# Patient Record
Sex: FEMALE | Race: WHITE
[De-identification: names, ages, dates, MRNs, and addresses within clinical notes are randomized per-mention and may not be internally consistent; named-entity substitution may affect disease eponyms.]

---

## 2020-07-22 ENCOUNTER — HOSPITAL ENCOUNTER (EMERGENCY)
Dept: HOSPITAL 56 - MW.ED | Age: 25
Discharge: HOME | End: 2020-07-22
Payer: COMMERCIAL

## 2020-07-22 VITALS — DIASTOLIC BLOOD PRESSURE: 76 MMHG | SYSTOLIC BLOOD PRESSURE: 128 MMHG | HEART RATE: 88 BPM

## 2020-07-22 DIAGNOSIS — J45.909: ICD-10-CM

## 2020-07-22 DIAGNOSIS — R00.0: ICD-10-CM

## 2020-07-22 DIAGNOSIS — Z88.2: ICD-10-CM

## 2020-07-22 DIAGNOSIS — T78.2XXA: Primary | ICD-10-CM

## 2020-07-22 DIAGNOSIS — Z91.048: ICD-10-CM

## 2020-07-22 DIAGNOSIS — Z79.899: ICD-10-CM

## 2020-07-22 PROCEDURE — 96374 THER/PROPH/DIAG INJ IV PUSH: CPT

## 2020-07-22 PROCEDURE — 99285 EMERGENCY DEPT VISIT HI MDM: CPT

## 2020-07-22 PROCEDURE — 93005 ELECTROCARDIOGRAM TRACING: CPT

## 2020-07-22 PROCEDURE — 96375 TX/PRO/DX INJ NEW DRUG ADDON: CPT

## 2020-07-22 NOTE — EDM.PDOC
ED HPI GENERAL MEDICAL PROBLEM





- General


Chief Complaint: Allergic Reaction


Stated Complaint: ALLERGIC REACTION


Time Seen by Provider: 07/22/20 15:39





- History of Present Illness


INITIAL COMMENTS - FREE TEXT/NARRATIVE: 





History of present illness:


25-year-old female presenting with shortness of breath and throat tightness.  

Symptoms started about 1:30 PM.  Around 12:30 PM she was having allergy shots 

done and then she returned back to work and then shortly thereafter the symptoms

started.  She did self administer her epinephrine and EMS picked her up and she 

continued to have some shortness of breath.  They did give her some albuterol.  

She reports she is now feeling much better though does still have some heaviness

in the chest.





Review of systems: 


As per history of present illness and below otherwise all systems reviewed and 

negative.





Past medical history: 


As per history of present illness and as reviewed below otherwise 

noncontributory.





Surgical history: 


As per history of present illness and as reviewed below otherwise 

noncontributory.  Haughton teeth, ear tubes, tear duct tube





Social history: 


No reported history of drug or alcohol abuse.  No tobacco





Family history: 


As per history of present illness and as reviewed below otherwise 

noncontributory.





Physical exam:


GEN: no acute distress, well appearing


HEENT: Atraumatic, normocephalic, mucous membranes moist, 


Neck: supple, nontender, trachea midline.


Lungs: No respiratory distress.


Heart: Slightly tachycardic


Abdomen: Soft, nondistended, nontender. 


Back: nontender


Extremities: Atraumatic. Neurovascularly intact.


Neuro: Awake, alert, oriented. Neuro Exam nonfocal.


Psych: Mildly anxious


Skin: warm, dry, no lesions





Diagnostics:


[]





Therapeutics:


[]





MDM: 





Impression: 


[]





Plan:


[]





Definitive disposition and diagnosis as appropriate pending reevaluation and 

review of above.











- Related Data


                                    Allergies











Allergy/AdvReac Type Severity Reaction Status Date / Time


 


sulfamethoxazole Allergy Severe Redness Verified 10/19/14 11:39 MDT





[From Septra]     


 


trimethoprim [From Septra] Allergy Severe Redness Verified 10/19/14 11:39 MDT


 


horses Allergy Severe Airway Uncoded 10/19/14 11:39 MDT





   Tightness  


 


seasonal Allergy  Airway Uncoded 07/22/20 15:39





   Tightness  











Home Meds: 


                                    Home Meds





Albuterol Sulfate [Albuterol Sulfate Hfa] 8.5 gm IH Q6HR PRN 04/15/20 [History]


Amoxicillin/Potassium Clav [Augmentin 875-125 Tablet]  04/15/20 [History]


Birth Control   04/15/20 [History]


Budesonide/Formoterol Fumarate [Symbicort 160-4.5 Mcg Inhaler] 1 puff IN BID 

04/15/20 [History]


Montelukast [Singulair] 10 mg PO DAILY 04/15/20 [History]


EPINEPHrine [Epinephrine] 0.3 mg IM ONETIME PRN #2 applic 07/22/20 [Rx]


predniSONE [Prednisone] 60 mg PO DAILY 5 Days #15 tablet 07/22/20 [Rx]











Past Medical History


Respiratory History: Reports: Asthma





- Infectious Disease History


Infectious Disease History: Reports: None





- Past Surgical History


HEENT Surgical History: Reports: Myringotomy w Tube(s), Oral Surgery





Social & Family History





- Family History


Family Medical History: Noncontributory





ED ROS ALLERGIC REACTION





- Review of Systems


Review Of Systems: See Below (See HPI)





ED EXAM GENERAL NO PERIP PULSE





- Physical Exam


Exam: See Below (See HPI)





EKG INTERPRETATION


EKG Interpretation Comments: 





EKG performed at 3:50 PM shows sinus tachycardia, rate 104, single T wave 

inversion and Q waves in lead III.  No STEMI.





Course





- Vital Signs


Text/Narrative:: 





anaphylaxis, likely from allergy shots.


Now improved after patient's own epipen and benadryl, pepcid, prednisone and 

fluids.


EKG sinus. No ischemia.


Chest pain and dyspnea likely related to anaphylaxis. 


Tachycardia resolved on reassessment.


Epipen refilled, plan prednisone for 3 more days.


STOP allergy shots until cleared by her allergist in [place]. 


Last Recorded V/S: 


                                Last Vital Signs











Temp  97.9 F   07/22/20 15:39


 


Pulse  88   07/22/20 18:26


 


Resp  18   07/22/20 15:39


 


BP  128/76   07/22/20 18:26


 


Pulse Ox  98   07/22/20 18:26














- Orders/Labs/Meds


Orders: 


                               Active Orders 24 hr











 Category Date Time Status


 


 EKG Documentation Completion [RC] STAT Care  07/22/20 15:42 Active


 


 Saline Lock Insert [OM.PC] Stat Oth  07/22/20 15:42 Ordered











Meds: 


Medications














Discontinued Medications














Generic Name Dose Route Start Last Admin





  Trade Name Freq  PRN Reason Stop Dose Admin


 


Diphenhydramine HCl  50 mg  07/22/20 15:42  07/22/20 16:00





  Benadryl  IVPUSH  07/22/20 15:43  50 mg





  ONETIME ONE   Administration


 


Famotidine  20 mg  07/22/20 15:42  07/22/20 16:00





  Pepcid  IVPUSH  07/22/20 15:43  20 mg





  ONETIME ONE   Administration


 


Sodium Chloride  1,000 mls @ 999 mls/hr  07/22/20 15:42  07/22/20 15:59





  Normal Saline  IV  07/22/20 16:42  999 mls/hr





  .Bolus ONE   Administration


 


Prednisone  60 mg  07/22/20 15:42  07/22/20 15:59





  Prednisone  PO  07/22/20 15:43  60 mg





  ONETIME ONE   Administration


 


Sodium Chloride  10 ml  07/22/20 15:42  07/22/20 16:00





  Saline Flush  FLUSH   10 ml





  ASDIRECTED PRN   Administration





  Keep Vein Open  


 


Sodium Chloride  2.5 ml  07/22/20 15:42  07/22/20 16:00





  Saline Flush  FLUSH   2.5 ml





  ASDIRECTED PRN   Administration





  Keep Vein Open  














- Re-Assessments/Exams


Free Text/Narrative Re-Assessment/Exam: 





07/22/20 17:15


Patient feeling well.  Symptoms much improved.  No chest pain.  No shortness of 

breath or throat tightness.  Discussed with patient we will observe until but 6 

PM which will be 4 and half hours after symptom onset.  She agrees with this 

plan.





07/22/20 18:04


Feeling well.  No acute distress.  No further episodes of anaphylaxis.  

Electronic prescription sent for epinephrine and will also have prednisone 

prescription E subscribed.


HR improved.


Chest pain and dyspnea resolved and have not returned during ED stay.


Discussed with patient need to stop allergy shots until she talks to and is 

cleared by allergist in [place]. 








Departure





- Departure


Time of Disposition: 18:05


Disposition: Home, Self-Care 01


Clinical Impression: 


 Anaphylaxis








- Discharge Information


Prescriptions: 


EPINEPHrine [Epinephrine] 0.3 mg IM ONETIME PRN #2 applic


 PRN Reason: Other


predniSONE [Prednisone] 60 mg PO DAILY 5 Days #15 tablet


Instructions:  How to Use an Auto-Injector Pen, Bronchospasm, Adult, 

Easy-to-Read, Anaphylactic Reaction, Adult, Easy-to-Read, Allergies, Adult


Referrals: 


Reji Oconnell MD [Primary Care Provider] - 2 Days


Forms:  ED Return to Work/School Form


Additional Instructions: 


Please be sure to keep your EpiPen with you at all times.  Please take the 

prednisone for the next 3 days and if symptoms have not returned by that time 

you may stop at that time.  Return to the emergency department if any worsening 

symptoms.  Return to the emergency department if your allergic symptoms return 

and/or if you need to re-use the epinephrine.  Do not leave the EpiPen in the 

glove compartment of the car as this will denature the medication.





The following information is given to patients seen in the emergency department 

who are being discharged to home. This information is to outline your options 

for follow-up care. We provide all patients seen in our emergency department 

with a follow-up referral.





The need for follow-up, as well as the timing and circumstances, are variable 

depending upon the specifics of your emergency department visit.





If you don't have a primary care physician on staff, we will provide you with a 

referral. We always advise you to contact your personal physician following an 

emergency department visit to inform them of the circumstance of the visit and 

for follow-up with them and/or the need for any referrals to a consulting 

specialist.





The emergency department will also refer you to a specialist when appropriate. 

This referral assures that you have the opportunity for follow-up care with a 

specialist. All of these measure are taken in an effort to provide you with 

optimal care, which includes your follow-up.





Under all circumstances we always encourage you to contact your private 

physician who remains a resource for coordinating your care. When calling for 

follow-up care, please make the office aware that this follow-up is from your 

recent emergency room visit. If for any reason you are refused follow-up, please

contact the [facility] Emergency Department

at [phone] and asked to speak to the emergency department charge nurse.











Sepsis Event Note (ED)





- Focused Exam


Vital Signs: 


                                   Vital Signs











  Temp Pulse Resp BP Pulse Ox


 


 07/22/20 18:26   88   128/76  98


 


 07/22/20 15:39  97.9 F  110 H  18  118/86  95














- My Orders


Last 24 Hours: 


My Active Orders





07/22/20 15:42


EKG Documentation Completion [RC] STAT 


Saline Lock Insert [OM.PC] Stat 














- Assessment/Plan


Last 24 Hours: 


My Active Orders





07/22/20 15:42


EKG Documentation Completion [RC] STAT 


Saline Lock Insert [OM.PC] Stat

## 2020-07-23 ENCOUNTER — HOSPITAL ENCOUNTER (EMERGENCY)
Dept: HOSPITAL 56 - MW.ED | Age: 25
Discharge: HOME | End: 2020-07-23
Payer: COMMERCIAL

## 2020-07-23 VITALS — SYSTOLIC BLOOD PRESSURE: 125 MMHG | DIASTOLIC BLOOD PRESSURE: 68 MMHG

## 2020-07-23 VITALS — HEART RATE: 96 BPM

## 2020-07-23 DIAGNOSIS — Z88.1: ICD-10-CM

## 2020-07-23 DIAGNOSIS — Z88.2: ICD-10-CM

## 2020-07-23 DIAGNOSIS — Z79.899: ICD-10-CM

## 2020-07-23 DIAGNOSIS — Z88.8: ICD-10-CM

## 2020-07-23 DIAGNOSIS — R06.02: ICD-10-CM

## 2020-07-23 DIAGNOSIS — T38.0X5A: ICD-10-CM

## 2020-07-23 DIAGNOSIS — T45.0X5A: ICD-10-CM

## 2020-07-23 DIAGNOSIS — R42: Primary | ICD-10-CM

## 2020-07-23 DIAGNOSIS — R00.2: ICD-10-CM

## 2020-07-23 DIAGNOSIS — J45.909: ICD-10-CM

## 2020-07-23 DIAGNOSIS — T47.0X5A: ICD-10-CM

## 2020-07-23 LAB
BUN SERPL-MCNC: 14 MG/DL (ref 7–18)
CHLORIDE SERPL-SCNC: 101 MMOL/L (ref 98–107)
CO2 SERPL-SCNC: 19.2 MMOL/L (ref 21–32)
GLUCOSE SERPL-MCNC: 230 MG/DL (ref 74–106)
POTASSIUM SERPL-SCNC: 3.7 MMOL/L (ref 3.5–5.1)
SODIUM SERPL-SCNC: 137 MMOL/L (ref 136–145)

## 2020-07-23 PROCEDURE — 99285 EMERGENCY DEPT VISIT HI MDM: CPT

## 2020-07-23 PROCEDURE — 84443 ASSAY THYROID STIM HORMONE: CPT

## 2020-07-23 PROCEDURE — 81001 URINALYSIS AUTO W/SCOPE: CPT

## 2020-07-23 PROCEDURE — 96361 HYDRATE IV INFUSION ADD-ON: CPT

## 2020-07-23 PROCEDURE — 85025 COMPLETE CBC W/AUTO DIFF WBC: CPT

## 2020-07-23 PROCEDURE — 94640 AIRWAY INHALATION TREATMENT: CPT

## 2020-07-23 PROCEDURE — 96374 THER/PROPH/DIAG INJ IV PUSH: CPT

## 2020-07-23 PROCEDURE — 80053 COMPREHEN METABOLIC PANEL: CPT

## 2020-07-23 PROCEDURE — 84439 ASSAY OF FREE THYROXINE: CPT

## 2020-07-23 NOTE — EDM.PDOC
ED HPI GENERAL MEDICAL PROBLEM





- General


Chief Complaint: Respiratory Problem


Stated Complaint: TROUBLE BREATHING SHAKING


Time Seen by Provider: 07/23/20 11:14


Source of Information: Reports: Patient


History Limitations: Reports: No Limitations





- History of Present Illness


INITIAL COMMENTS - FREE TEXT/NARRATIVE: 





25-year-old female history of asthma presents today for feeling shortness of 

breath, clammy, palpitation, shaky.  She was sent home today from work for the 

symptoms.  Yesterday she was seen in the ER for anaphylactic shock, yesterday 

she gave herself epinephrine prior to coming to the ER, in the ER she received 

Pepcid, steroids, Benadryl and was sent home at 6 PM after she felt better.  She

denies nausea, vomiting, abdominal pain, rash, HA, neck pain, neck stiffness. 





ROS: A 10-point review of systems, other than pertinent positives and negatives 

as stated per HPI, is otherwise negative


________________________________________________________________________________





PHYSICAL EXAM





General: AOx4, GCS = 15, No distress, tremulous


HEENT: dry mucous membrane, no swelling or erythema to posterior oropharynx.  

Mallampati score = 1


Neck: supple, no meningismus, no Kernig or Brudzinski, able to shake her head 

left and right with no distress.


Cardiac: S1S2 tachcyardia


Respiratory: CTAB, no crackles or rales, no wheezing


Abdomen: Soft, nontender, no rebound or guarding, nondistended, no pulsatile 

mass.


Back: nontender


Musculoskeletal: NVI distally, no deformity


Neuro: No focal deficits, CN 2 - 12 WNL.











- Related Data


                                    Allergies











Allergy/AdvReac Type Severity Reaction Status Date / Time


 


sulfamethoxazole Allergy Severe Redness Verified 07/23/20 10:49





[From Septra]     


 


trimethoprim [From Septra] Allergy Severe Redness Verified 07/23/20 10:49


 


horses Allergy Severe Airway Uncoded 10/19/14 11:39 MDT





   Tightness  


 


seasonal Allergy  Airway Uncoded 07/22/20 15:39





   Tightness  











Home Meds: 


                                    Home Meds





Albuterol Sulfate [Albuterol Sulfate Hfa] 8.5 gm IH Q6HR PRN 04/15/20 [History]


Birth Control  1 tab PO DAILY 04/15/20 [History]


Budesonide/Formoterol Fumarate [Symbicort 160-4.5 Mcg Inhaler] 1 puff IN BID 

04/15/20 [History]


Montelukast [Singulair] 10 mg PO DAILY 04/15/20 [History]


EPINEPHrine [Epinephrine] 0.3 mg IM ONETIME PRN #2 applic 07/22/20 [Rx]


predniSONE [Prednisone] 60 mg PO DAILY 5 Days #15 tablet 07/22/20 [Rx]


Albuterol [Proair HFA] 1 puff INH BID 07/23/20 [History]











Past Medical History


HEENT History: Reports: None


Cardiovascular History: Reports: None


Respiratory History: Reports: Asthma


Gastrointestinal History: Reports: None


Genitourinary History: Reports: None


OB/GYN History: Reports: None


Musculoskeletal History: Reports: None


Neurological History: Reports: None


Psychiatric History: Reports: None


Endocrine/Metabolic History: Reports: None


Hematologic History: Reports: None


Immunologic History: Reports: None


Oncologic (Cancer) History: Reports: None


Dermatologic History: Reports: None





- Infectious Disease History


Infectious Disease History: Reports: None





- Past Surgical History


Head Surgeries/Procedures: Reports: None


HEENT Surgical History: Reports: Myringotomy w Tube(s), Oral Surgery


Cardiovascular Surgical History: Reports: None


Respiratory Surgical History: Reports: None


GI Surgical History: Reports: None


Female  Surgical History: Reports: None


Endocrine Surgical History: Reports: None


Neurological Surgical History: Reports: None


Musculoskeletal Surgical History: Reports: None


Oncologic Surgical History: Reports: None


Dermatological Surgical History: Reports: None





Social & Family History





- Family History


Family Medical History: Noncontributory





- Tobacco Use


Smoking Status *Q: Never Smoker


Second Hand Smoke Exposure: No





- Caffeine Use


Caffeine Use: Reports: Coffee, Soda





- Recreational Drug Use


Recreational Drug Use: No





ED ROS GENERAL





- Review of Systems


Review Of Systems: Comprehensive ROS is negative, except as noted in HPI.





ED EXAM, GENERAL





- Physical Exam


Exam: See Below (see dictation)





Course





- Vital Signs


Last Recorded V/S: 


                                Last Vital Signs











Temp  98.2 F   07/23/20 10:55


 


Pulse  98   07/23/20 12:57


 


Resp  17   07/23/20 12:57


 


BP  125/68   07/23/20 12:57


 


Pulse Ox  98   07/23/20 12:57














- Orders/Labs/Meds


Orders: 


                               Active Orders 24 hr











 Category Date Time Status


 


 RT Aerosol Therapy [RC] ASDIRECTED Care  07/23/20 11:30 Active


 


 Sodium Chloride 0.9% [Normal Saline] 1,000 ml Med  07/23/20 11:45 Active





 IV ASDIRECTED   








                                Medication Orders





Sodium Chloride (Normal Saline)  1,000 mls @ 9,999 mls/hr IV ASDIRECTED IRINA


   Last Admin: 07/23/20 11:41  Dose: 9,999 mls/hr


   Documented by: TSERING








Labs: 


                                Laboratory Tests











  07/23/20 07/23/20 07/23/20 Range/Units





  10:53 10:53 12:01 


 


WBC  17.35 H    (4.0-11.0)  K/uL


 


RBC  5.15    (4.30-5.90)  M/uL


 


Hgb  14.5    (12.0-16.0)  g/dL


 


Hct  44.1    (36.0-46.0)  %


 


MCV  85.6    (80.0-98.0)  fL


 


MCH  28.2    (27.0-32.0)  pg


 


MCHC  32.9    (31.0-37.0)  g/dL


 


RDW Std Deviation  41.8    (28.0-62.0)  fl


 


RDW Coeff of Lance  13    (11.0-15.0)  %


 


Plt Count  496 H    (150-400)  K/uL


 


MPV  10.10    (7.40-12.00)  fL


 


Neut % (Auto)  92.2 H    (48.0-80.0)  %


 


Lymph % (Auto)  5.9 L    (16.0-40.0)  %


 


Mono % (Auto)  1.9    (0.0-15.0)  %


 


Eos % (Auto)  0.0    (0.0-7.0)  %


 


Baso % (Auto)  0.0    (0.0-1.5)  %


 


Neut # (Auto)  16.0 H    (1.4-5.7)  K/uL


 


Lymph # (Auto)  1.0    (0.6-2.4)  K/uL


 


Mono # (Auto)  0.3    (0.0-0.8)  K/uL


 


Eos # (Auto)  0.0    (0.0-0.7)  K/uL


 


Baso # (Auto)  0.0    (0.0-0.1)  K/uL


 


Nucleated RBC %  0.0    /100WBC


 


Nucleated RBCs #  0    K/uL


 


Sodium   137   (136-145)  mmol/L


 


Potassium   3.7   (3.5-5.1)  mmol/L


 


Chloride   101   ()  mmol/L


 


Carbon Dioxide   19.2 L   (21.0-32.0)  mmol/L


 


BUN   14   (7.0-18.0)  mg/dL


 


Creatinine   1.2 H   (0.6-1.0)  mg/dL


 


Est Cr Clr Drug Dosing   62.42   mL/min


 


Estimated GFR (MDRD)   55.2   ml/min


 


Glucose   230 H   ()  mg/dL


 


Calcium   9.1   (8.5-10.1)  mg/dL


 


Total Bilirubin   0.2   (0.2-1.0)  mg/dL


 


AST   12 L   (15-37)  IU/L


 


ALT   18   (14-63)  IU/L


 


Alkaline Phosphatase   100   ()  U/L


 


Total Protein   8.9 H   (6.4-8.2)  g/dL


 


Albumin   3.8   (3.4-5.0)  g/dL


 


Globulin   5.1 H   (2.6-4.0)  g/dL


 


Albumin/Globulin Ratio   0.8 L   (0.9-1.6)  


 


Free T4   1.07   (0.76-1.46)  ng/dL


 


TSH 3rd Generation   0.75   (0.36-3.74)  uIU/mL


 


Urine Color    YELLOW  


 


Urine Appearance    HAZY  


 


Urine pH    6.5  (5.0-8.0)  


 


Ur Specific Gravity    <= 1.005  (1.001-1.035)  


 


Urine Protein    NEGATIVE  (NEGATIVE)  mg/dL


 


Urine Glucose (UA)    100 H  (NEGATIVE)  mg/dL


 


Urine Ketones    NEGATIVE  (NEGATIVE)  mg/dL


 


Urine Occult Blood    TRACE-INTACT H  (NEGATIVE)  


 


Urine Nitrite    NEGATIVE  (NEGATIVE)  


 


Urine Bilirubin    NEGATIVE  (NEGATIVE)  


 


Urine Urobilinogen    0.2  (<2.0)  EU/dL


 


Ur Leukocyte Esterase    NEGATIVE  (NEGATIVE)  


 


Urine RBC    0-2  (0-2/HPF)  


 


Urine WBC    0-1  (0-5/HPF)  


 


Ur Epithelial Cells    RARE  (NONE-FEW)  


 


Urine Bacteria    RARE  (NEGATIVE)  











Meds: 


Medications











Generic Name Dose Route Start Last Admin





  Trade Name Freq  PRN Reason Stop Dose Admin


 


Sodium Chloride  1,000 mls @ 9,999 mls/hr  07/23/20 11:45  07/23/20 11:41





  Normal Saline  IV   9,999 mls/hr





  ASDIRECTED IRINA   Administration














Discontinued Medications














Generic Name Dose Route Start Last Admin





  Trade Name Freq  PRN Reason Stop Dose Admin


 


Albuterol/Ipratropium  3 ml  07/23/20 11:30  07/23/20 11:38





  Duoneb 3.0-0.5 Mg/3 Ml  NEB  07/23/20 11:31  3 ml





  ONETIME ONE   Administration


 


Lorazepam  1 mg  07/23/20 11:39  07/23/20 11:42





  Ativan  IVPUSH  07/23/20 11:40  1 mg





  ONETIME ONE   Administration














- Re-Assessments/Exams


Free Text/Narrative Re-Assessment/Exam: 





07/23/20 13:20


After IV Ativan and IV fluids, she feels much improved, and she feels stable for

discharge. I performed a repeat examination and the patient has not demonstrated

any new abnormal findings. Patient exhibits normal vital signs and has exhibited

a normal gait. I advised the patient to return to the ER for reevaluation if 

symptoms worsened, and to follow up with their PCP within 2-3 days.  I gave her 

strict return precautions.





________________________________________________________________________________





MEDICAL DECISION MAKING: I reviewed the patients past medical records, lab and 

radiographic findings. I discussed the case with the patient. My differential 

diagnosis included: Electrolyte abnormality, anxiety.  Patient's blood work 

demonstrated no abnormalities of her thyroid.  She took 60 mg of prednisone 

prior to arrival and she was given steroids in the ER yesterday, her 

leukocytosis and hyperglycemia is likely secondary to her recent steroid intake.

 I do not suspect infectious etiology from her leukocytosis.  She has no 

complaints of headache, neck pain or neck stiffness, I do not suspect 

meningitis, her urine did not reveal any signs of UTI.  She has no complaints of

cough, I do not suspect pneumonia, her abdomen is soft and nontender, I do not 

suspect intra-abdominal infectious etiology.  She has no tenderness to her 

C-spine/T-spine/L-spine, I do not suspect epidural abscess.





Departure





- Departure


Time of Disposition: 13:22


Disposition: Home, Self-Care 01


Condition: Good


Clinical Impression: 


 Medication reaction, Dizziness








- Discharge Information


*PRESCRIPTION DRUG MONITORING PROGRAM REVIEWED*: Not Applicable


*COPY OF PRESCRIPTION DRUG MONITORING REPORT IN PATIENT KALI: Not Applicable


Referrals: 


PCP,Unknown [Primary Care Provider] - 3 Days


Forms:  ED Department Discharge


Additional Instructions: 


The following information is given to patients seen in the emergency department 

who are being discharged to home. This information is to outline your options 

for follow-up care. We provide all patients seen in our emergency department 

with a follow-up referral.





The need for follow-up, as well as the timing and circumstances, are variable 

depending upon the specifics of your emergency department visit.





If you don't have a primary care physician on staff, we will provide you with a 

referral. We always advise you to contact your personal physician following an 

emergency department visit to inform them of the circumstance of the visit and 

for follow-up with them and/or the need for any referrals to a consulting 

specialist.





The emergency department will also refer you to a specialist when appropriate. 

This referral assures that you have the opportunity for follow-up care with a 

specialist. All of these measure are taken in an effort to provide you with 

optimal care, which includes your follow-up.





Under all circumstances we always encourage you to contact your private 

physician who remains a resource for coordinating your care. When calling for 

follow-up care, please make the office aware that this follow-up is from your 

recent emergency room visit. If for any reason you are refused follow-up, please

contact the [facility] Emergency Department

at [phone] and asked to speak to the emergency department charge nurse.





If you do not have a primary care doctor, please follow up with the clinics 

below within 3-5 days. 





[facility]


[address] [place] [id number]


Phone: [phone]


Fax: [phone]





[address] [place]


[place] [address]


Phone: [phone]


Fax: [phone]








Sepsis Event Note (ED)





- Evaluation


Sepsis Screening Result: No Definite Risk





- Focused Exam


Vital Signs: 


                                   Vital Signs











  Temp Pulse Resp BP Pulse Ox


 


 07/23/20 12:57   98  17  125/68  98


 


 07/23/20 11:34   102 H  18  140/70  96


 


 07/23/20 10:55  98.2 F  112 H  18  132/77  98














- My Orders


Last 24 Hours: 


My Active Orders





07/23/20 11:30


RT Aerosol Therapy [RC] ASDIRECTED 





07/23/20 11:45


Sodium Chloride 0.9% [Normal Saline] 1,000 ml IV ASDIRECTED 














- Assessment/Plan


Last 24 Hours: 


My Active Orders





07/23/20 11:30


RT Aerosol Therapy [RC] ASDIRECTED 





07/23/20 11:45


Sodium Chloride 0.9% [Normal Saline] 1,000 ml IV ASDIRECTED

## 2021-01-11 ENCOUNTER — HOSPITAL ENCOUNTER (EMERGENCY)
Dept: HOSPITAL 56 - MW.ED | Age: 26
Discharge: HOME | End: 2021-01-11
Payer: COMMERCIAL

## 2021-01-11 VITALS — HEART RATE: 96 BPM

## 2021-01-11 VITALS — SYSTOLIC BLOOD PRESSURE: 147 MMHG | DIASTOLIC BLOOD PRESSURE: 79 MMHG

## 2021-01-11 DIAGNOSIS — Z79.899: ICD-10-CM

## 2021-01-11 DIAGNOSIS — R05: ICD-10-CM

## 2021-01-11 DIAGNOSIS — R06.02: Primary | ICD-10-CM

## 2021-01-11 DIAGNOSIS — Z88.2: ICD-10-CM

## 2021-01-11 DIAGNOSIS — Z88.1: ICD-10-CM

## 2021-01-11 DIAGNOSIS — Z88.8: ICD-10-CM

## 2021-01-11 LAB
BUN SERPL-MCNC: 11 MG/DL (ref 7–18)
CHLORIDE SERPL-SCNC: 102 MMOL/L (ref 98–107)
CO2 SERPL-SCNC: 26.8 MMOL/L (ref 21–32)
GLUCOSE SERPL-MCNC: 78 MG/DL (ref 74–106)
POTASSIUM SERPL-SCNC: 4 MMOL/L (ref 3.5–5.1)
SODIUM SERPL-SCNC: 141 MMOL/L (ref 136–145)

## 2021-01-11 PROCEDURE — 85025 COMPLETE CBC W/AUTO DIFF WBC: CPT

## 2021-01-11 PROCEDURE — 81025 URINE PREGNANCY TEST: CPT

## 2021-01-11 PROCEDURE — 71045 X-RAY EXAM CHEST 1 VIEW: CPT

## 2021-01-11 PROCEDURE — 36415 COLL VENOUS BLD VENIPUNCTURE: CPT

## 2021-01-11 PROCEDURE — 80048 BASIC METABOLIC PNL TOTAL CA: CPT

## 2021-01-11 PROCEDURE — 99285 EMERGENCY DEPT VISIT HI MDM: CPT

## 2021-01-11 PROCEDURE — 71275 CT ANGIOGRAPHY CHEST: CPT

## 2021-01-11 PROCEDURE — 96372 THER/PROPH/DIAG INJ SC/IM: CPT

## 2021-01-11 PROCEDURE — 85379 FIBRIN DEGRADATION QUANT: CPT

## 2021-01-11 NOTE — EDM.PDOC
ED HPI GENERAL MEDICAL PROBLEM





- General


Chief Complaint: Respiratory Problem


Stated Complaint: POSSIBLE PNEUMONIA


Time Seen by Provider: 01/11/21 16:59


Source of Information: Reports: Patient


History Limitations: Reports: No Limitations





- History of Present Illness


INITIAL COMMENTS - FREE TEXT/NARRATIVE: 





Pt is a 25-year-old female who presents today for cough and shortness of breath.

 Patient states that she has had a cough that is now productive with green 

sputum.  Patient states she has some shortness of breath and feels tight that 

she cannot inhale and take a deep breath.  Patient denies any fever chills 

nausea vomiting recent travel lower extremity swelling.  Patient is to use 

inhaler at home use #4 she came in.  Patient says inhaler has not helped relieve

her shortness of breath.





- Related Data


                                    Allergies











Allergy/AdvReac Type Severity Reaction Status Date / Time


 


sulfamethoxazole Allergy Severe Redness Verified 07/23/20 10:49





[From Septra]     


 


trimethoprim [From Septra] Allergy Severe Redness Verified 07/23/20 10:49


 


doxycycline Allergy  Other Verified 01/11/21 16:50


 


Sulfa (Sulfonamide Allergy  Other Verified 01/11/21 16:50





Antibiotics)     


 


horses Allergy Severe Airway Uncoded 10/19/14 11:39 MDT





   Tightness  


 


seasonal Allergy  Airway Uncoded 07/22/20 15:39





   Tightness  











Home Meds: 


                                    Home Meds





Albuterol Sulfate [Albuterol Sulfate Hfa] 8.5 gm IH Q6HR PRN 04/15/20 [History]


Birth Control  1 tab PO DAILY 04/15/20 [History]


Budesonide/Formoterol Fumarate [Symbicort 160-4.5 Mcg Inhaler] 1 puff IN BID 

04/15/20 [History]


Montelukast [Singulair] 10 mg PO DAILY 04/15/20 [History]


EPINEPHrine [Epinephrine] 0.3 mg IM ONETIME PRN #2 applic 07/22/20 [Rx]


Fluticasone Propion/Salmeterol [Airduo Respiclick 232-14 Mcg] 1 inh INH BID 

01/11/21 [History]


Levalbuterol Tartrate [Xopenex HFA] 1 inh INH DAILY PRN 01/11/21 [History]











Past Medical History


HEENT History: Reports: None, Sinusitis


Cardiovascular History: Reports: None


Respiratory History: Reports: Asthma


Gastrointestinal History: Reports: None


Genitourinary History: Reports: None


OB/GYN History: Reports: None


Musculoskeletal History: Reports: None


Neurological History: Reports: None


Psychiatric History: Reports: None


Endocrine/Metabolic History: Reports: None


Hematologic History: Reports: None


Immunologic History: Reports: None


Oncologic (Cancer) History: Reports: None


Dermatologic History: Reports: None





- Infectious Disease History


Infectious Disease History: Reports: None





- Past Surgical History


Head Surgeries/Procedures: Reports: None


HEENT Surgical History: Reports: Myringotomy w Tube(s), Oral Surgery


Cardiovascular Surgical History: Reports: None


Respiratory Surgical History: Reports: None


GI Surgical History: Reports: None


Female  Surgical History: Reports: None


Endocrine Surgical History: Reports: None


Neurological Surgical History: Reports: None


Musculoskeletal Surgical History: Reports: None


Oncologic Surgical History: Reports: None


Dermatological Surgical History: Reports: None





Social & Family History





- Family History


Family Medical History: No Pertinent Family History





- Caffeine Use


Caffeine Use: Reports: None





- Recreational Drug Use


Recreational Drug Use: No





ED ROS GENERAL





- Review of Systems


Review Of Systems: See Below


Constitutional: Reports: No Symptoms


HEENT: Reports: No Symptoms


Respiratory: Reports: Shortness of Breath, Cough


Cardiovascular: Reports: No Symptoms


Endocrine: Reports: No Symptoms


GI/Abdominal: Reports: No Symptoms


: Reports: No Symptoms


Musculoskeletal: Reports: No Symptoms


Skin: Reports: No Symptoms


Neurological: Reports: No Symptoms


Psychiatric: Reports: No Symptoms


Hematologic/Lymphatic: Reports: No Symptoms


Immunologic: Reports: No Symptoms





ED EXAM, GENERAL





- Physical Exam


Exam: See Below


Exam Limited By: No Limitations


General Appearance: Alert, WD/WN


Respiratory/Chest: No Respiratory Distress, Lungs Clear


Cardiovascular: Normal Peripheral Pulses, Regular Rate, Rhythm


GI/Abdominal: Normal Bowel Sounds, Soft, No Mass


Neurological: Alert, Oriented, CN II-XII Intact





Course





- Vital Signs


Last Recorded V/S: 


                                Last Vital Signs











Temp  97.2 F   01/11/21 16:54


 


Pulse  105 H  01/11/21 17:54


 


Resp  18   01/11/21 17:54


 


BP  147/79 H  01/11/21 17:54


 


Pulse Ox  96   01/11/21 17:54














- Orders/Labs/Meds


Orders: 


                               Active Orders 24 hr











 Category Date Time Status


 


 Chest PE [Ang Chest] [CT] Stat Exams  01/11/21 19:13 Ordered











Labs: 


                                Laboratory Tests











  01/11/21 01/11/21 01/11/21 Range/Units





  17:27 17:34 17:34 


 


WBC   13.25 H   (4.0-11.0)  K/uL


 


RBC   5.10   (4.30-5.90)  M/uL


 


Hgb   14.3   (12.0-16.0)  g/dL


 


Hct   42.6   (36.0-46.0)  %


 


MCV   83.5   (80.0-98.0)  fL


 


MCH   28.0   (27.0-32.0)  pg


 


MCHC   33.6   (31.0-37.0)  g/dL


 


RDW Std Deviation   40.1   (28.0-62.0)  fl


 


RDW Coeff of Lance   13   (11.0-15.0)  %


 


Plt Count   453 H   (150-400)  K/uL


 


MPV   8.60   (7.40-12.00)  fL


 


Neut % (Auto)   65.5   (48.0-80.0)  %


 


Lymph % (Auto)   25.8   (16.0-40.0)  %


 


Mono % (Auto)   6.0   (0.0-15.0)  %


 


Eos % (Auto)   2.5   (0.0-7.0)  %


 


Baso % (Auto)   0.2   (0.0-1.5)  %


 


Neut # (Auto)   8.7 H   (1.4-5.7)  K/uL


 


Lymph # (Auto)   3.4 H   (0.6-2.4)  K/uL


 


Mono # (Auto)   0.8   (0.0-0.8)  K/uL


 


Eos # (Auto)   0.3   (0.0-0.7)  K/uL


 


Baso # (Auto)   0.0   (0.0-0.1)  K/uL


 


D-Dimer, Quantitative     (0.0-0.50)  mg/L FEU


 


Sodium    141  (136-145)  mmol/L


 


Potassium    4.0  (3.5-5.1)  mmol/L


 


Chloride    102  ()  mmol/L


 


Carbon Dioxide    26.8  (21.0-32.0)  mmol/L


 


BUN    11  (7.0-18.0)  mg/dL


 


Creatinine    0.8  (0.6-1.0)  mg/dL


 


Est Cr Clr Drug Dosing    93.64  mL/min


 


Estimated GFR (MDRD)    > 60.0  ml/min


 


Glucose    78  ()  mg/dL


 


Calcium    9.5  (8.5-10.1)  mg/dL


 


Urine HCG, Qual  NEGATIVE    (NEGATIVE)  














  01/11/21 Range/Units





  17:35 


 


WBC   (4.0-11.0)  K/uL


 


RBC   (4.30-5.90)  M/uL


 


Hgb   (12.0-16.0)  g/dL


 


Hct   (36.0-46.0)  %


 


MCV   (80.0-98.0)  fL


 


MCH   (27.0-32.0)  pg


 


MCHC   (31.0-37.0)  g/dL


 


RDW Std Deviation   (28.0-62.0)  fl


 


RDW Coeff of Lance   (11.0-15.0)  %


 


Plt Count   (150-400)  K/uL


 


MPV   (7.40-12.00)  fL


 


Neut % (Auto)   (48.0-80.0)  %


 


Lymph % (Auto)   (16.0-40.0)  %


 


Mono % (Auto)   (0.0-15.0)  %


 


Eos % (Auto)   (0.0-7.0)  %


 


Baso % (Auto)   (0.0-1.5)  %


 


Neut # (Auto)   (1.4-5.7)  K/uL


 


Lymph # (Auto)   (0.6-2.4)  K/uL


 


Mono # (Auto)   (0.0-0.8)  K/uL


 


Eos # (Auto)   (0.0-0.7)  K/uL


 


Baso # (Auto)   (0.0-0.1)  K/uL


 


D-Dimer, Quantitative  0.68 H  (0.0-0.50)  mg/L FEU


 


Sodium   (136-145)  mmol/L


 


Potassium   (3.5-5.1)  mmol/L


 


Chloride   ()  mmol/L


 


Carbon Dioxide   (21.0-32.0)  mmol/L


 


BUN   (7.0-18.0)  mg/dL


 


Creatinine   (0.6-1.0)  mg/dL


 


Est Cr Clr Drug Dosing   mL/min


 


Estimated GFR (MDRD)   ml/min


 


Glucose   ()  mg/dL


 


Calcium   (8.5-10.1)  mg/dL


 


Urine HCG, Qual   (NEGATIVE)  











Meds: 


Medications














Discontinued Medications














Generic Name Dose Route Start Last Admin





  Trade Name Jasper  PRN Reason Stop Dose Admin


 


Dexamethasone  10 mg  01/11/21 17:27  01/11/21 17:53





  Decadron  IM  01/11/21 17:28  10 mg





  NOW STA   Administration


 


Lidocaine HCl  15 ml  01/11/21 17:10  01/11/21 17:25





  Xylocaine 2% Viscous  PO  01/11/21 17:11  15 ml





  ONETIME ONE   Administration














Departure





- Departure


Time of Disposition: 19:17


Disposition: Still A Patient 30


Condition: Good


Clinical Impression: 


 SOB (shortness of breath)








- Discharge Information


Referrals: 


Reji Oconnell MD [Primary Care Provider] - 


Forms:  ED Department Discharge





Sepsis Event Note (ED)





- Evaluation


Sepsis Screening Result: No Definite Risk





- Focused Exam


Vital Signs: 


                                   Vital Signs











  Temp Pulse Resp BP Pulse Ox


 


 01/11/21 17:54   105 H  18  147/79 H  96


 


 01/11/21 16:54  97.2 F  104 H  18  144/75 H  96














- My Orders


Last 24 Hours: 


My Active Orders





01/11/21 19:13


Chest PE [Ang Chest] [CT] Stat 














- Assessment/Plan


Last 24 Hours: 


My Active Orders





01/11/21 19:13


Chest PE [Ang Chest] [CT] Stat 











Assessment:: 





Patient is a 25-year-old female who presents today for cough and shortness of 

breath.  Will obtain x-ray and reassess.

## 2021-01-11 NOTE — CT
INDICATION:



Dyspnea and elevated D-dimer 



COMPARISON:



None 



TECHNIQUE: :



CT examination of the chest was performed with the uneventful intravenous 

administration of 100 cc of Isovue 370 while thin axial sections were 

obtained from above the apices of the lungs to the lung bases. 



TECHNICAL NOTE: LIMITED DUE TO MOTION ARTIFACT, SOFT TISSUE ATTENUATION 

FACTORS AND BOLUS TIMING EFFECT 



Please note that all CT scans at this facility use dose modulation, 

iterative reconstruction, and/or weight-based dosing when appropriate to 

reduce radiation dose to as low as reasonably achievable. 



FINDINGS: :



HEART and MEDIASTINUM: The heart size is normal. There is no mediastinal or 

hilar adenopathy or mass. There is no pericardial effusion.



PULMONARY ARTERIAL CIRCULATION: Significantly limited but no definite large 

central pulmonary embolus. Smaller more peripheral emboli might be 

inapparent on this study



LUNGS: The lungs show no focal consolidation or mass. The airways appear 

normal. 



PLEURAL SPACES: There is no pleural effusion, pneumothorax or pleural based 

mass. 



VISUALIZED UPPER ABDOMEN: Hepatic steatosis limited visualized upper 

abdominal structures appear normal. 



OSSEOUS STRUCTURES: Age-appropriate appearance. No acute fracture or 

destructive process.



TUBES and LINES: None.



IMPRESSION:



1. Technically limited regarding exclusion of pulmonary embolus. No 

pulmonary embolus is visible on this study. Smaller more peripheral emboli 

might be inapparent on this study 



2. The lungs and pleural space appear normal. 



3. Hepatic steatosis



Please note that all CT scans at this facility use dose modulation, 

iterative reconstruction, and/or weight-based dosing when appropriate to 

reduce radiation dose to as low as reasonably achievable.



Dictated by Sagar Bourgeois MD @ Jan 11 2021  8:10PM



Signed by Dr. Sagar Bourgeois @ Jan 11 2021  8:18PM

## 2021-01-11 NOTE — CR
INDICATION: SOB, cough x 3 days1 image



TECHNIQUE:



Chest 1 view. 



COMPARISON:



4/15/20



FINDINGS:



Cardiovascular and mediastinum: Heart size and vasculature are normal in 

caliber and appearance.  Mediastinum is within normal limits.  



Lungs and pleural space: Lungs are clear.  No sign of infiltrate or mass.  

No sign of pleural effusion.  No pneumothorax.  



Bones and soft tissues: No significant findings.  



IMPRESSION:



Unremarkable chest.



Dictated by: Low Astudillo MD @ 01/11/2021 18:12:56



(Electronically Signed)

## 2022-02-25 ENCOUNTER — HOSPITAL ENCOUNTER (EMERGENCY)
Dept: HOSPITAL 56 - MW.ED | Age: 27
Discharge: HOME | End: 2022-02-25
Payer: COMMERCIAL

## 2022-02-25 VITALS — SYSTOLIC BLOOD PRESSURE: 128 MMHG | HEART RATE: 108 BPM | DIASTOLIC BLOOD PRESSURE: 61 MMHG

## 2022-02-25 DIAGNOSIS — Z3A.23: ICD-10-CM

## 2022-02-25 DIAGNOSIS — Z88.2: ICD-10-CM

## 2022-02-25 DIAGNOSIS — Z88.1: ICD-10-CM

## 2022-02-25 DIAGNOSIS — Z91.09: ICD-10-CM

## 2022-02-25 DIAGNOSIS — J45.909: ICD-10-CM

## 2022-02-25 DIAGNOSIS — K52.9: ICD-10-CM

## 2022-02-25 DIAGNOSIS — Z91.048: ICD-10-CM

## 2022-02-25 DIAGNOSIS — O99.612: Primary | ICD-10-CM

## 2022-02-25 LAB
BUN SERPL-MCNC: 10 MG/DL (ref 7–18)
CHLORIDE SERPL-SCNC: 101 MMOL/L (ref 98–107)
CO2 SERPL-SCNC: 21.3 MMOL/L (ref 21–32)
GLUCOSE SERPL-MCNC: 128 MG/DL (ref 74–106)
LIPASE SERPL-CCNC: 50 U/L (ref 73–393)
POTASSIUM SERPL-SCNC: 3.4 MMOL/L (ref 3.5–5.1)
SODIUM SERPL-SCNC: 135 MMOL/L (ref 136–145)

## 2022-02-25 PROCEDURE — 80053 COMPREHEN METABOLIC PANEL: CPT

## 2022-02-25 PROCEDURE — 85025 COMPLETE CBC W/AUTO DIFF WBC: CPT

## 2022-02-25 PROCEDURE — 83690 ASSAY OF LIPASE: CPT

## 2022-02-25 PROCEDURE — 81001 URINALYSIS AUTO W/SCOPE: CPT

## 2022-02-25 PROCEDURE — 36415 COLL VENOUS BLD VENIPUNCTURE: CPT

## 2022-02-25 PROCEDURE — 96374 THER/PROPH/DIAG INJ IV PUSH: CPT

## 2022-02-25 PROCEDURE — 99284 EMERGENCY DEPT VISIT MOD MDM: CPT

## 2022-02-25 PROCEDURE — 71045 X-RAY EXAM CHEST 1 VIEW: CPT

## 2022-06-24 ENCOUNTER — HOSPITAL ENCOUNTER (INPATIENT)
Dept: HOSPITAL 56 - MW.OBCHECK | Age: 27
LOS: 5 days | Discharge: HOME | DRG: 540 | End: 2022-06-29
Attending: OBSTETRICS & GYNECOLOGY | Admitting: OBSTETRICS & GYNECOLOGY
Payer: COMMERCIAL

## 2022-06-24 DIAGNOSIS — Z20.822: ICD-10-CM

## 2022-06-24 DIAGNOSIS — Z3A.40: ICD-10-CM

## 2022-06-24 LAB
BUN SERPL-MCNC: 10 MG/DL (ref 7–18)
CHLORIDE SERPL-SCNC: 102 MMOL/L (ref 98–107)
CO2 SERPL-SCNC: 19.3 MMOL/L (ref 21–32)
EGFRCR SERPLBLD CKD-EPI 2021: 91 ML/MIN (ref 60–?)
GLUCOSE SERPL-MCNC: 144 MG/DL (ref 74–106)
HBA1C BLD-MCNC: 5.8 %
POTASSIUM SERPL-SCNC: 4 MMOL/L (ref 3.5–5.1)
SODIUM SERPL-SCNC: 136 MMOL/L (ref 136–145)

## 2022-06-24 PROCEDURE — U0002 COVID-19 LAB TEST NON-CDC: HCPCS

## 2022-06-24 RX ADMIN — MISOPROSTOL PRN MCG: 100 TABLET ORAL at 19:31

## 2022-06-24 RX ADMIN — MISOPROSTOL PRN MCG: 100 TABLET ORAL at 23:50

## 2022-06-25 RX ADMIN — MISOPROSTOL PRN MCG: 100 TABLET ORAL at 09:49

## 2022-06-25 RX ADMIN — BUTORPHANOL TARTRATE PRN MG: 1 INJECTION INTRAMUSCULAR; INTRAVENOUS at 07:54

## 2022-06-25 RX ADMIN — BUTORPHANOL TARTRATE PRN MG: 1 INJECTION INTRAMUSCULAR; INTRAVENOUS at 13:48

## 2022-06-25 RX ADMIN — MISOPROSTOL PRN MCG: 100 TABLET ORAL at 04:52

## 2022-06-26 LAB
BUN SERPL-MCNC: 11 MG/DL (ref 7–18)
CHLORIDE SERPL-SCNC: 104 MMOL/L (ref 98–107)
CO2 SERPL-SCNC: 18.5 MMOL/L (ref 21–32)
EGFRCR SERPLBLD CKD-EPI 2021: 105 ML/MIN (ref 60–?)
GLUCOSE SERPL-MCNC: 112 MG/DL (ref 74–106)
POTASSIUM SERPL-SCNC: 4.4 MMOL/L (ref 3.5–5.1)
SODIUM SERPL-SCNC: 138 MMOL/L (ref 136–145)

## 2022-06-26 PROCEDURE — 10907ZC DRAINAGE OF AMNIOTIC FLUID, THERAPEUTIC FROM PRODUCTS OF CONCEPTION, VIA NATURAL OR ARTIFICIAL OPENING: ICD-10-PCS | Performed by: OBSTETRICS & GYNECOLOGY

## 2022-06-26 PROCEDURE — 00HU33Z INSERTION OF INFUSION DEVICE INTO SPINAL CANAL, PERCUTANEOUS APPROACH: ICD-10-PCS | Performed by: OBSTETRICS & GYNECOLOGY

## 2022-06-26 PROCEDURE — 3E0R3BZ INTRODUCTION OF ANESTHETIC AGENT INTO SPINAL CANAL, PERCUTANEOUS APPROACH: ICD-10-PCS | Performed by: OBSTETRICS & GYNECOLOGY

## 2022-06-26 PROCEDURE — 10H07YZ INSERTION OF OTHER DEVICE INTO PRODUCTS OF CONCEPTION, VIA NATURAL OR ARTIFICIAL OPENING: ICD-10-PCS | Performed by: OBSTETRICS & GYNECOLOGY

## 2022-06-26 PROCEDURE — 3E033VJ INTRODUCTION OF OTHER HORMONE INTO PERIPHERAL VEIN, PERCUTANEOUS APPROACH: ICD-10-PCS | Performed by: OBSTETRICS & GYNECOLOGY

## 2022-06-26 RX ADMIN — KETOROLAC TROMETHAMINE SCH MG: 30 INJECTION, SOLUTION INTRAMUSCULAR at 12:19

## 2022-06-26 RX ADMIN — KETOROLAC TROMETHAMINE SCH MG: 30 INJECTION, SOLUTION INTRAMUSCULAR at 23:18

## 2022-06-26 RX ADMIN — KETOROLAC TROMETHAMINE SCH MG: 30 INJECTION, SOLUTION INTRAMUSCULAR at 17:37

## 2022-06-26 RX ADMIN — KETOROLAC TROMETHAMINE SCH MG: 30 INJECTION, SOLUTION INTRAMUSCULAR at 05:28

## 2022-06-27 RX ADMIN — KETOROLAC TROMETHAMINE SCH MG: 30 INJECTION, SOLUTION INTRAMUSCULAR at 05:12

## 2022-06-27 RX ADMIN — OXYCODONE HYDROCHLORIDE AND ACETAMINOPHEN PRN TAB: 5; 325 TABLET ORAL at 16:03

## 2022-06-27 RX ADMIN — OXYCODONE HYDROCHLORIDE AND ACETAMINOPHEN PRN TAB: 5; 325 TABLET ORAL at 20:30

## 2022-06-27 RX ADMIN — OXYCODONE HYDROCHLORIDE AND ACETAMINOPHEN PRN TAB: 5; 325 TABLET ORAL at 07:38

## 2022-06-28 RX ADMIN — OXYCODONE HYDROCHLORIDE AND ACETAMINOPHEN PRN TAB: 5; 325 TABLET ORAL at 08:49

## 2022-06-28 RX ADMIN — HYDROCODONE BITARTRATE AND ACETAMINOPHEN PRN TAB: 5; 325 TABLET ORAL at 20:55

## 2022-06-28 RX ADMIN — OXYCODONE HYDROCHLORIDE AND ACETAMINOPHEN PRN TAB: 5; 325 TABLET ORAL at 04:37

## 2022-06-29 VITALS — DIASTOLIC BLOOD PRESSURE: 82 MMHG | HEART RATE: 87 BPM | SYSTOLIC BLOOD PRESSURE: 129 MMHG

## 2022-06-29 RX ADMIN — HYDROCODONE BITARTRATE AND ACETAMINOPHEN PRN TAB: 5; 325 TABLET ORAL at 02:54

## 2022-06-29 RX ADMIN — HYDROCODONE BITARTRATE AND ACETAMINOPHEN PRN TAB: 5; 325 TABLET ORAL at 07:32
